# Patient Record
Sex: MALE | Race: BLACK OR AFRICAN AMERICAN | NOT HISPANIC OR LATINO | URBAN - METROPOLITAN AREA
[De-identification: names, ages, dates, MRNs, and addresses within clinical notes are randomized per-mention and may not be internally consistent; named-entity substitution may affect disease eponyms.]

---

## 2017-04-21 ENCOUNTER — TRANSFERRED RECORDS (OUTPATIENT)
Dept: HEALTH INFORMATION MANAGEMENT | Facility: CLINIC | Age: 42
End: 2017-04-21

## 2017-05-01 ENCOUNTER — TRANSFERRED RECORDS (OUTPATIENT)
Dept: HEALTH INFORMATION MANAGEMENT | Facility: CLINIC | Age: 42
End: 2017-05-01

## 2017-05-01 ENCOUNTER — MEDICAL CORRESPONDENCE (OUTPATIENT)
Dept: HEALTH INFORMATION MANAGEMENT | Facility: CLINIC | Age: 42
End: 2017-05-01

## 2017-07-13 ENCOUNTER — OFFICE VISIT (OUTPATIENT)
Dept: SURGERY | Facility: CLINIC | Age: 42
End: 2017-07-13

## 2017-07-13 VITALS
TEMPERATURE: 98.5 F | OXYGEN SATURATION: 97 % | HEART RATE: 73 BPM | HEIGHT: 71 IN | WEIGHT: 181.3 LBS | DIASTOLIC BLOOD PRESSURE: 81 MMHG | SYSTOLIC BLOOD PRESSURE: 115 MMHG | BODY MASS INDEX: 25.38 KG/M2

## 2017-07-13 DIAGNOSIS — Z21 HIV POSITIVE (H): ICD-10-CM

## 2017-07-13 DIAGNOSIS — K62.82 ANAL DYSPLASIA: Primary | ICD-10-CM

## 2017-07-13 RX ORDER — CALCIUM CARBONATE 500(1250)
1 TABLET ORAL 2 TIMES DAILY
COMMUNITY

## 2017-07-13 RX ORDER — LORATADINE 10 MG/1
10 TABLET ORAL DAILY
COMMUNITY

## 2017-07-13 RX ORDER — FLUOXETINE HYDROCHLORIDE 90 MG/1
90 CAPSULE, DELAYED RELEASE PELLETS ORAL WEEKLY
COMMUNITY

## 2017-07-13 ASSESSMENT — PAIN SCALES - GENERAL: PAINLEVEL: NO PAIN (0)

## 2017-07-13 NOTE — NURSING NOTE
"Chief Complaint   Patient presents with     Clinic Care Coordination - Follow-up     Patient here today for HRA.        Vitals:    07/13/17 1453   BP: 115/81   Pulse: 73   Temp: 98.5  F (36.9  C)   TempSrc: Oral   SpO2: 97%   Weight: 181 lb 4.8 oz   Height: 5' 11\"       Body mass index is 25.29 kg/(m^2).    Shar GONZALEZ LPN                        "

## 2017-07-13 NOTE — MR AVS SNAPSHOT
"              After Visit Summary   2017    Martin Yanez    MRN: [id number]           Patient Information     Date Of Birth          1975        Visit Information        Provider Department      2017 3:00 PM Liliane Waite APRN [facility] Colon and Rectal Surgery        Today's Diagnoses     Anal dysplasia    -  1    HIV positive (H)           Follow-ups after your visit        Who to contact     Please call your clinic at [phone] to:    Ask questions about your health    Make or cancel appointments    Discuss your medicines    Learn about your test results    Speak to your doctor   If you have compliments or concerns about an experience at your clinic, or if you wish to file a complaint, please contact [facility] Patient Relations at [phone] or email us at [email or web address]         Additional Information About Your Visit        MyChart Information     [employer] is an electronic gateway that provides easy, online access to your medical records. With [employer], you can request a clinic appointment, read your test results, renew a prescription or communicate with your care team.     To sign up for [employer] visit the website at [email or web address]   You will be asked to enter the access code listed below, as well as some personal information. Please follow the directions to create your username and password.     Your access code is: [email or web address]  Expires: 2017  6:31 AM     Your access code will  in 90 days. If you need help or a new code, please contact your [facility] or call [phone] for assistance.        Care EveryWhere ID     This is your Care EveryWhere ID. This could be used by other organizations to access your [facility] medical records  [id number]        Your Vitals Were     Pulse Temperature Height Pulse Oximetry BMI (Body Mass Index)       73 98.5  F (36.9  C) (Oral) 5' 11\" " 97% 25.29 kg/m2        Blood Pressure from Last 3 Encounters:   07/13/17 115/81   12/08/16 120/71    Weight from Last 3 Encounters:   07/13/17 181 lb 4.8 oz   12/08/16 175 lb 3.2 oz              We Performed the Following     Cytology non gyn (Anal PAP)     HC ANOSCOPY DX, HRA W/ BIOPSY(IES)     Surgical pathology exam        Primary Care Provider Office Phone # Fax #    Familia Leon [phone]       [facility] [address]        Equal Access to Services     JOEL CHICAS : Hadii sachin Munoz, watwyla quijano, cristhian morenoalarely lino, lisa henley. So [facility] [phone].    ATENCIÓN: Si habla español, tiene a fernandez disposición servicios gratuitos de asistencia lingüística. [facility] [phone].    We comply with applicable federal civil rights laws and [place] laws. We do not discriminate on the basis of race, color, national origin, age, disability sex, sexual orientation or gender identity.            Thank you!     Thank you for choosing [facility] COLON AND RECTAL SURGERY  for your care. Our goal is always to provide you with excellent care. Hearing back from our patients is one way we can continue to improve our services. Please take a few minutes to complete the written survey that you may receive in the mail after your visit with us. Thank you!             Your Updated Medication List - Protect others around you: Learn how to safely use, store and throw away your medicines at www.disposemymeds.org.          This list is accurate as of: 7/13/17  3:36 PM.  Always use your most recent med list.                   Brand Name Dispense Instructions for use Diagnosis    abacavir 300 MG tablet    ZIAGEN     Take 300 mg by mouth 2 times daily    Anal dysplasia       calcium carbonate 1250 MG tablet    OS-LILIYA 500 mg [place]. Ca     Take 1 tablet by mouth 2 times daily        FISH OIL OMEGA-3 PO           FLUoxetine 90 MG CR  capsule    PROzac WEEKLY     Take 90 mg by mouth once a week        GABAPENTIN PO      Take 300 mg by mouth 4 times daily        loratadine 10 MG tablet    CLARITIN     Take 10 mg by mouth daily        MULTIVITAMIN ADULT PO           nicotine 7 MG/24HR 24 hr patch    NICODERM CQ     Place 1 patch onto the skin every 24 hours    Anal dysplasia       VITAMIN B 12 PO           VITAMIN C PO           VITAMIN D (CHOLECALCIFEROL) PO      Take by mouth daily

## 2017-07-13 NOTE — LETTER
2017       RE: Martin Yanez  [address]  [place] [address]     Dear Colleague,    Thank you for referring your patient, Martin Yanez, to the [facility] COLON AND RECTAL SURGERY at [facility]. Please see a copy of my visit note below.      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     HPI: Martin reports that his HIV is under good control. He quit smoking two days after his last visit. He does have anal receptive intercourse. He denies any anorectal complaints.    ASSESSMENT: Written, informed consent was obtained prior to procedure.  Prior to the start of the procedure and with procedural staff participation, I verbally confirmed the patient s identity using two indicators, relevant allergies, that the procedure was appropriate and matched the consent or emergent situation, and that the correct equipment/implants were available. Immediately prior to starting the procedure I conducted the Time Out with the procedural staff and re-confirmed the patient s name, procedure, and site/side. (The Joint Commission universal protocol was followed.)  Yes    Sedation (Moderate or Deep): None    Anal cytology was obtained with Dacron swab. Digital anal rectal exam was performed with no palpable lesions. Dilute acetic acid soak was completed for 2 minutes. Lubricant was used to insert the anoscope. Performed high resolution microscopy using the colposcope. The dentate line was viewed in its entirety. Abnormal areas noted were a distinct patch of bright acetowhitening with punctation and striated vessels in the left lateral position just inside the anal verge.  After injection with 1.5% lidocaine with  epinephrine, biopsy was obtained in the left lateral anal canal using a baby Tischler forceps. Hemostasis was obtained using Monsel solution. There was some more mild acetowhitening without punctation in the posterior anal canal extending to the anal verge. No additional biopsies taken.  Perianal area was inspected after soaking with acetic acid. Small skin tag present in the right perianal position without abnormality. No other acetowhitening or punctation present.    The patient tolerated the procedures well.    PLAN: Will follow up with patient with results of biopsy and anal cytology from today. If low grade dysplasia or normal, follow up in 6 months for repeat high resolution anoscopy. If high grade dysplasia would recommend repeat high-resolution anoscopy in the operating room with fulguration and repeat high-resolution anoscopy 3 months following that in clinic. I congratulated him no smoking cessation.    Patient's questions were answered to his stated satisfaction and he is in agreement with this plan.    For details of past medical history, surgical history, family history, medications, allergies, and review of systems, please see details below.    Medical history:  No past medical history on file.    Surgical history:  No past surgical history on file.    Family history:  No family history on file.    Medications:  Current Outpatient Prescriptions   Medication Sig Dispense Refill     abacavir (ZIAGEN) 300 MG tablet Take 300 mg by mouth 2 times daily       dolutegravir (TIVICAY) 50 MG tablet Take 50 mg by mouth daily       LAMIVUDINE PO Take 150 mg by mouth 2 times daily       nicotine (NICODERM CQ) 7 MG/24HR 24 hr patch Place 1 patch onto the skin every 24 hours       Allergies:  The patientis allergic to efavirenz.  Social history:  Social History   Substance Use Topics     Smoking status: Not on file     Smokeless tobacco: Not on file     Alcohol use Not on file     Marital status: single.    Review of  Systems:  There are no exam notes on file for this visit.     This procedure was performed under a collaborative agreement with Dr. Logan Montana MD, Chief of Colon and Rectal Surgery, [facility].    This note was created using speech recognition software and may contain unintended word substitutions.      Again, thank you for allowing me to participate in the care of your patient.      Sincerely,    DELORY Larios CNP

## 2017-07-13 NOTE — PROGRESS NOTES
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     HPI: Martin reports that his HIV is under good control. He quit smoking two days after his last visit. He does have anal receptive intercourse. He denies any anorectal complaints.    ASSESSMENT: Written, informed consent was obtained prior to procedure.  Prior to the start of the procedure and with procedural staff participation, I verbally confirmed the patient s identity using two indicators, relevant allergies, that the procedure was appropriate and matched the consent or emergent situation, and that the correct equipment/implants were available. Immediately prior to starting the procedure I conducted the Time Out with the procedural staff and re-confirmed the patient s name, procedure, and site/side. (The Joint Commission universal protocol was followed.)  Yes    Sedation (Moderate or Deep): None    Anal cytology was obtained with Dacron swab. Digital anal rectal exam was performed with no palpable lesions. Dilute acetic acid soak was completed for 2 minutes. Lubricant was used to insert the anoscope. Performed high resolution microscopy using the colposcope. The dentate line was viewed in its entirety. Abnormal areas noted were a distinct patch of bright acetowhitening with punctation and striated vessels in the left lateral position just inside the anal verge.  After injection with 1.5% lidocaine with epinephrine, biopsy was obtained in the left lateral anal canal using a baby Tischler forceps. Hemostasis was obtained using Monsel solution. There was some more mild acetowhitening without punctation in the posterior anal canal extending to the anal verge. No additional biopsies taken.  Perianal area  was inspected after soaking with acetic acid. Small skin tag present in the right perianal position without abnormality. No other acetowhitening or punctation present.    The patient tolerated the procedures well.    PLAN: Will follow up with patient with results of biopsy and anal cytology from today. If low grade dysplasia or normal, follow up in 6 months for repeat high resolution anoscopy. If high grade dysplasia would recommend repeat high-resolution anoscopy in the operating room with fulguration and repeat high-resolution anoscopy 3 months following that in clinic. I congratulated him no smoking cessation.    Patient's questions were answered to his stated satisfaction and he is in agreement with this plan.    For details of past medical history, surgical history, family history, medications, allergies, and review of systems, please see details below.    Medical history:  No past medical history on file.    Surgical history:  No past surgical history on file.    Family history:  No family history on file.    Medications:  Current Outpatient Prescriptions   Medication Sig Dispense Refill     abacavir (ZIAGEN) 300 MG tablet Take 300 mg by mouth 2 times daily       dolutegravir (TIVICAY) 50 MG tablet Take 50 mg by mouth daily       LAMIVUDINE PO Take 150 mg by mouth 2 times daily       nicotine (NICODERM CQ) 7 MG/24HR 24 hr patch Place 1 patch onto the skin every 24 hours       Allergies:  The patientis allergic to efavirenz.    Social history:  Social History   Substance Use Topics     Smoking status: Not on file     Smokeless tobacco: Not on file     Alcohol use Not on file     Marital status: single.    Review of Systems:  There are no exam notes on file for this visit.     This procedure was performed under a collaborative agreement with Dr. Logan Montana MD, Chief of Colon and Rectal Surgery, [facility].    Liliane Bolden NP-C  Colon and Rectal Surgery  [facility]  [place] [facility]      This note was created using speech recognition software and may contain unintended word substitutions.

## 2017-07-15 LAB — COPATH REPORT: NORMAL

## 2017-07-17 ENCOUNTER — TELEPHONE (OUTPATIENT)
Dept: SURGERY | Facility: CLINIC | Age: 42
End: 2017-07-17

## 2017-07-17 LAB — COPATH REPORT: NORMAL

## 2017-07-17 NOTE — TELEPHONE ENCOUNTER
Called to discuss pathology results showing AIN 1. Would recommend repeat HRA in 6 months. Left a message.    DELROY Wilcox, NP-C  Colon and Rectal Surgery  [facility]

## 2018-02-07 ENCOUNTER — TELEPHONE (OUTPATIENT)
Dept: SURGERY | Facility: CLINIC | Age: 43
End: 2018-02-07

## 2018-02-07 NOTE — TELEPHONE ENCOUNTER
I spoke with patient that I will be faxing a request to the [facility] to obtain authorization to have him return for follow-up anal microscopy with Liliane Bolden (Colorectal).  If [facility] a call [facility] he knows to call them back to approve. Geraldine [phone]    18 I had not received the [facility] authorization so I called [facility] phone [phone] and she said the consult has been written up but the patient never returned their call saying he has agreed to the visit.  I left a VM on the patient's phone to call the [facility] asap this week or the consult will  and we'll need to start over. Once the [facility] receives patient's phone call, the [facility] will fax me the authorization and then I can schedule the patient.  Geraldine [phone]    5/15/18 Left VM for patient that the [facility] Steven phone [phone] will be faxing me the authorization and to return my call to schedule a date.

## 2018-07-06 ENCOUNTER — TELEPHONE (OUTPATIENT)
Dept: SURGERY | Facility: CLINIC | Age: 43
End: 2018-07-06

## 2018-07-06 NOTE — TELEPHONE ENCOUNTER
Left VM for pt to return my call to schedule follow-up (anal microscopy) appt w/ Liliane Bolden (Colorectal).  This is the 2nd appointment that he missed.  Geraldine [phone]

## 2018-08-02 ENCOUNTER — OFFICE VISIT (OUTPATIENT)
Dept: SURGERY | Facility: CLINIC | Age: 43
End: 2018-08-02
Payer: COMMERCIAL

## 2018-08-02 VITALS
TEMPERATURE: 98.1 F | HEIGHT: 71 IN | BODY MASS INDEX: 25.56 KG/M2 | WEIGHT: 182.6 LBS | DIASTOLIC BLOOD PRESSURE: 78 MMHG | SYSTOLIC BLOOD PRESSURE: 111 MMHG | HEART RATE: 68 BPM | OXYGEN SATURATION: 97 %

## 2018-08-02 DIAGNOSIS — K62.82 ANAL DYSPLASIA: Primary | ICD-10-CM

## 2018-08-02 ASSESSMENT — PAIN SCALES - GENERAL: PAINLEVEL: NO PAIN (0)

## 2018-08-02 NOTE — PROGRESS NOTES
Colon and Rectal Surgery Clinic High Resolution Anoscopy Note    RE: Martin Yanez  : 1975  VONNIE: 2018    Martin Yanez is a 41 year old HIV positive male with a history of ASCUS on anal cytology in 2016 and HRA last on 2017 with biopsies showing AIN 1 with last anal cytology on 17, which was negative. He presents today for high resolution anoscopy.     HPI: Martin reports that his HIV is under good control. He started smoking again but is back on a patch and is trying to quit again. He does have anal receptive intercourse. He denies any anorectal complaints.    ASSESSMENT: Written, informed consent was obtained prior to procedure.  Prior to the start of the procedure and with procedural staff participation, I verbally confirmed the patient s identity using two indicators, relevant allergies, that the procedure was appropriate and matched the consent or emergent situation, and that the correct equipment/implants were available. Immediately prior to starting the procedure I conducted the Time Out with the procedural staff and re-confirmed the patient s name, procedure, and site/side. (The Joint Commission universal protocol was followed.)  Yes    Sedation (Moderate or Deep): None    Anal cytology was not obtained today. Digital anal rectal exam was performed with no palpable lesions. Dilute acetic acid soak was completed for 2 minutes. Lubricant was used to insert the anoscope. Performed high resolution microscopy using the colposcope. The dentate line was viewed in its entirety. Abnormal areas noted were a distinct patch of acetowhitening with punctation and striated vessels in the left lateral position just inside the anal verge.  Prior biopsy at this site showed AIN 1 and this appeared unchanged so no additional biopsy taken today.  There was some more mild acetowhitening without punctation in the posterior anal canal extending to the anal verge.   Perianal area was inspected after soaking  with acetic acid. Small skin tag present in the right perianal position without abnormality. No other acetowhitening or punctation present.    The patient tolerated the procedures well.    PLAN: Recommend repeat high resolution anoscopy in clinic in 6 months. Strongly encouraged smoking cessation and congratulated him on his current efforts at this.     Patient's questions were answered to his stated satisfaction and he is in agreement with this plan.    For details of past medical history, surgical history, family history, medications, allergies, and review of systems, please see details below.    Medical history:  No past medical history on file.    Surgical history:  No past surgical history on file.    Family history:  No family history on file.    Medications:  Current Outpatient Prescriptions   Medication Sig Dispense Refill     abacavir (ZIAGEN) 300 MG tablet Take 300 mg by mouth 2 times daily       Ascorbic Acid (VITAMIN C PO)        calcium carbonate (OS-LILIYA 500 MG [place]. CA) 1250 MG tablet Take 1 tablet by mouth 2 times daily       Cyanocobalamin (VITAMIN B 12 PO)        FLUoxetine (PROZAC WEEKLY) 90 MG CR capsule Take 90 mg by mouth once a week       GABAPENTIN PO Take 300 mg by mouth 4 times daily       loratadine (CLARITIN) 10 MG tablet Take 10 mg by mouth daily       Multiple Vitamins-Minerals (MULTIVITAMIN ADULT PO)        nicotine (NICODERM CQ) 7 MG/24HR 24 hr patch Place 1 patch onto the skin every 24 hours       Omega-3 Fatty Acids (FISH OIL OMEGA-3 PO)        VITAMIN D, CHOLECALCIFEROL, PO Take by mouth daily       Allergies:  The patientis allergic to efavirenz.    Social history:  Social History   Substance Use Topics     Smoking status: Former Smoker     Types: Cigarettes     Smokeless tobacco: Not on file     Alcohol use Yes     Marital status: single.    Review of Systems:  There are no exam notes on file for this visit.     This procedure was performed under a collaborative agreement with   Logan Montana MD, Chief of Colon and Rectal Surgery, [facility].    Liliane Bolden NP-C  Colon and Rectal Surgery  [facility]      This note was created using speech recognition software and may contain unintended word substitutions.

## 2018-08-02 NOTE — LETTER
2018       RE: Martin Yanez  [address]  [place] [address]     Dear Colleague,    Thank you for referring your patient, Martin Yanez, to the [facility] COLON AND RECTAL SURGERY at [facility]. Please see a copy of my visit note below.      Colon and Rectal Surgery Clinic High Resolution Anoscopy Note    RE: Martin Yanez  : 1975  OVNNIE: 2018    Martin Yanez is a 41 year old HIV positive male with a history of ASCUS on anal cytology in 2016 and HRA last on 2017 with biopsies showing AIN 1 with last anal cytology on 17, which was negative. He presents today for high resolution anoscopy.     HPI: Martin reports that his HIV is under good control. He started smoking again but is back on a patch and is trying to quit again. He does have anal receptive intercourse. He denies any anorectal complaints.    ASSESSMENT: Written, informed consent was obtained prior to procedure.  Prior to the start of the procedure and with procedural staff participation, I verbally confirmed the patient s identity using two indicators, relevant allergies, that the procedure was appropriate and matched the consent or emergent situation, and that the correct equipment/implants were available. Immediately prior to starting the procedure I conducted the Time Out with the procedural staff and re-confirmed the patient s name, procedure, and site/side. (The Joint Commission universal protocol was followed.)  Yes    Sedation (Moderate or Deep): None    Anal cytology was not obtained today. Digital anal rectal exam was performed with no palpable lesions. Dilute acetic acid soak was completed for 2 minutes. Lubricant was used to insert the anoscope. Performed high resolution microscopy using the colposcope. The dentate line was viewed in its entirety. Abnormal areas noted were a distinct patch of acetowhitening with punctation and striated vessels in the left lateral position just  inside the anal verge.  Prior biopsy at this site showed AIN 1 and this appeared unchanged so no additional biopsy taken today.  There was some more mild acetowhitening without punctation in the posterior anal canal extending to the anal verge.   Perianal area was inspected after soaking with acetic acid. Small skin tag present in the right perianal position without abnormality. No other acetowhitening or punctation present.    The patient tolerated the procedures well.    PLAN: Recommend repeat high resolution anoscopy in clinic in 6 months. Strongly encouraged smoking cessation and congratulated him on his current efforts at this.     Patient's questions were answered to his stated satisfaction and he is in agreement with this plan.    For details of past medical history, surgical history, family history, medications, allergies, and review of systems, please see details below.    Medical history:  No past medical history on file.    Surgical history:  No past surgical history on file.    Family history:  No family history on file.    Medications:  Current Outpatient Prescriptions   Medication Sig Dispense Refill     abacavir (ZIAGEN) 300 MG tablet Take 300 mg by mouth 2 times daily       Ascorbic Acid (VITAMIN C PO)        calcium carbonate (OS-LILIYA 500 MG [place]. CA) 1250 MG tablet Take 1 tablet by mouth 2 times daily       Cyanocobalamin (VITAMIN B 12 PO)        FLUoxetine (PROZAC WEEKLY) 90 MG CR capsule Take 90 mg by mouth once a week       GABAPENTIN PO Take 300 mg by mouth 4 times daily       loratadine (CLARITIN) 10 MG tablet Take 10 mg by mouth daily       Multiple Vitamins-Minerals (MULTIVITAMIN ADULT PO)        nicotine (NICODERM CQ) 7 MG/24HR 24 hr patch Place 1 patch onto the skin every 24 hours       Omega-3 Fatty Acids (FISH OIL OMEGA-3 PO)        VITAMIN D, CHOLECALCIFEROL, PO Take by mouth daily       Allergies:  The patientis allergic to efavirenz.    Social history:  Social History   Substance Use  Topics     Smoking status: Former Smoker     Types: Cigarettes     Smokeless tobacco: Not on file     Alcohol use Yes     Marital status: single.    Review of Systems:  There are no exam notes on file for this visit.     This procedure was performed under a collaborative agreement with Dr. Logan Montana MD, Chief of Colon and Rectal Surgery, [facility].    Liliane Bolden NP-C  Colon and Rectal Surgery  [facility]      This note was created using speech recognition software and may contain unintended word substitutions.

## 2018-08-02 NOTE — MR AVS SNAPSHOT
"              After Visit Summary   8/2/2018    Martin Yanez    MRN: [id number]           Patient Information     Date Of Birth          1975        Visit Information        Provider Department      8/2/2018 11:30 AM Liliane Waite APRN CNP [facility]        Today's Diagnoses     Anal dysplasia    -  1       Follow-ups after your visit        Who to contact     Please call your clinic at [phone] to:    Ask questions about your health    Make or cancel appointments    Discuss your medicines    Learn about your test results    Speak to your doctor            Additional Information About Your Visit        Care EveryWhere ID     This is your Care EveryWhere ID. This could be used by other organizations to access your [place] medical records  [id number]        Your Vitals Were     Pulse Temperature Height Pulse Oximetry BMI (Body Mass Index)       68 98.1  F (36.7  C) (Oral) 5' 11\" 97% 25.47 kg/m2        Blood Pressure from Last 3 Encounters:   08/02/18 111/78   07/13/17 115/81   12/08/16 120/71    Weight from Last 3 Encounters:   08/02/18 182 lb 9.6 oz   07/13/17 181 lb 4.8 oz   12/08/16 175 lb 3.2 oz              We Performed the Following     HC ANOSCOPY DX, HRA, INCL CONNIE SPECIMEN, BRUSH/WASH        Primary Care Provider Office Phone # Fax #    Familia Leon [phone]       [facility] [address] [facility] [id number]        Equal Access to Services     JOEL CHICAS : Hadii sachin ku hadasho Soyamel, waaxda luqadaha, qaybta kaalmada adeegyada, lisa starkey . So [facility] [phone].    ATENCIÓN: Si habla español, tiene a fernandez disposición servicios gratuitos de asistencia lingüística. Higinio al [phone].    We comply with applicable federal civil rights laws and [place] laws. We do not discriminate on the basis of race, color, national origin, age, disability, sex, sexual orientation, or gender " identity.            Thank you!     Thank you for choosing [facility]  for your care. Our goal is always to provide you with excellent care. Hearing back from our patients is one way we can continue to improve our services. Please take a few minutes to complete the written survey that you may receive in the mail after your visit with us. Thank you!             Your Updated Medication List - Protect others around you: Learn how to safely use, store and throw away your medicines at www.disposemOPS USAeds.org.          This list is accurate as of 8/2/18 12:25 PM.  Always use your most recent med list.                   Brand Name Dispense Instructions for use Diagnosis    abacavir 300 MG tablet    ZIAGEN     Take 300 mg by mouth 2 times daily    Anal dysplasia       calcium carbonate 500 MG tablet    OS-LILIYA 500 mg [place]. [place]     Take 1 tablet by mouth 2 times daily        FISH OIL OMEGA-3 PO           FLUoxetine 90 MG CR capsule    PROzac WEEKLY     Take 90 mg by mouth once a week        GABAPENTIN PO      Take 300 mg by mouth 4 times daily        loratadine 10 MG tablet    CLARITIN     Take 10 mg by mouth daily        MULTIVITAMIN ADULT PO           nicotine 7 MG/24HR 24 hr patch    NICODERM CQ     Place 1 patch onto the skin every 24 hours    Anal dysplasia       VITAMIN B 12 PO           VITAMIN C PO           VITAMIN D (CHOLECALCIFEROL) PO      Take by mouth daily

## 2018-08-02 NOTE — NURSING NOTE
"Chief Complaint   Patient presents with     Micrsoscopy       Vitals:    08/02/18 1114   BP: 111/78   Pulse: 68   Temp: 98.1  F (36.7  C)   TempSrc: Oral   SpO2: 97%   Weight: 182 lb 9.6 oz   Height: 5' 11\"       Body mass index is 25.47 kg/(m^2).      Estrada Cochran, EMT                      "

## 2020-12-30 ENCOUNTER — TRANSCRIBE ORDERS (OUTPATIENT)
Dept: OTHER | Age: 45
End: 2020-12-30

## 2020-12-30 DIAGNOSIS — R85.619 UNSPECIFIED ABNORMAL CYTOLOGICAL FINDINGS IN SPECIMENS FROM ANUS: Primary | ICD-10-CM

## 2022-03-22 ENCOUNTER — TRANSCRIBE ORDERS (OUTPATIENT)
Dept: OTHER | Age: 47
End: 2022-03-22

## 2022-03-22 DIAGNOSIS — K62.82 DYSPLASIA OF ANUS: Primary | ICD-10-CM

## 2022-03-24 ENCOUNTER — DOCUMENTATION ONLY (OUTPATIENT)
Dept: SURGERY | Facility: CLINIC | Age: 47
End: 2022-03-24

## 2022-03-24 NOTE — PROGRESS NOTES
Patient is scheduled for High Resolution Anoscopy with JACOBO Wilcox on 5/19/2022 at 12:00 pm. [facility] is [facility]. [facility] auth was requested on 3/24/2022. Instructed [facility] to fax back the authorization number to us at [phone].       Leatha Romo CMA

## 2022-07-15 ENCOUNTER — OFFICE VISIT (OUTPATIENT)
Dept: SURGERY | Facility: CLINIC | Age: 47
End: 2022-07-15
Payer: COMMERCIAL

## 2022-07-15 VITALS
OXYGEN SATURATION: 98 % | DIASTOLIC BLOOD PRESSURE: 72 MMHG | HEART RATE: 84 BPM | BODY MASS INDEX: 25.83 KG/M2 | WEIGHT: 184.5 LBS | SYSTOLIC BLOOD PRESSURE: 115 MMHG | HEIGHT: 71 IN

## 2022-07-15 DIAGNOSIS — K62.82 DYSPLASIA OF ANUS: ICD-10-CM

## 2022-07-15 LAB
LAB DIRECTOR COMMENTS: ABNORMAL
LAB DIRECTOR DISCLAIMER: ABNORMAL
LAB DIRECTOR INTERPRETATION: ABNORMAL
LAB DIRECTOR METHODOLOGY: ABNORMAL
LAB DIRECTOR RESULTS: ABNORMAL
SPECIMEN DESCRIPTION: ABNORMAL

## 2022-07-15 PROCEDURE — 99207 ZZHC ANOSCOPY DX, HRA, INCL COLL SPECIMEN, BRUSH/WASH: CPT | Performed by: NURSE PRACTITIONER

## 2022-07-15 PROCEDURE — 99203 OFFICE O/P NEW LOW 30 MIN: CPT | Mod: 25 | Performed by: NURSE PRACTITIONER

## 2022-07-15 PROCEDURE — 46601 DIAGNOSTIC ANOSCOPY: CPT | Performed by: NURSE PRACTITIONER

## 2022-07-15 PROCEDURE — 88112 CYTOPATH CELL ENHANCE TECH: CPT | Mod: TC | Performed by: NURSE PRACTITIONER

## 2022-07-15 PROCEDURE — 88112 CYTOPATH CELL ENHANCE TECH: CPT | Mod: 26 | Performed by: PATHOLOGY

## 2022-07-15 PROCEDURE — 87624 HPV HI-RISK TYP POOLED RSLT: CPT | Performed by: NURSE PRACTITIONER

## 2022-07-15 PROCEDURE — G0452 MOLECULAR PATHOLOGY INTERPR: HCPCS | Mod: 26 | Performed by: PATHOLOGY

## 2022-07-15 ASSESSMENT — PAIN SCALES - GENERAL: PAINLEVEL: NO PAIN (0)

## 2022-07-15 NOTE — NURSING NOTE
"Chief Complaint   Patient presents with     Follow Up     HRA       Vitals:    07/15/22 0921   BP: 115/72   BP Location: Left arm   Patient Position: Sitting   Cuff Size: Adult Regular   Pulse: 84   SpO2: 98%   Weight: 184 lb 8 oz   Height: 5' 11\"       Body mass index is 25.73 kg/m .    Leatha Romo CMA    "
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
## 2022-07-18 LAB
PATH REPORT.COMMENTS IMP SPEC: ABNORMAL
PATH REPORT.COMMENTS IMP SPEC: YES
PATH REPORT.FINAL DX SPEC: ABNORMAL
PATH REPORT.GROSS SPEC: ABNORMAL
PATH REPORT.MICROSCOPIC SPEC OTHER STN: ABNORMAL
PATH REPORT.RELEVANT HX SPEC: ABNORMAL

## 2023-09-29 ENCOUNTER — TELEPHONE (OUTPATIENT)
Dept: SURGERY | Facility: CLINIC | Age: 48
End: 2023-09-29

## 2023-10-27 ENCOUNTER — TRANSCRIBE ORDERS (OUTPATIENT)
Dept: OTHER | Age: 48
End: 2023-10-27

## 2023-10-27 DIAGNOSIS — K62.82 DYSPLASIA OF ANUS: Primary | ICD-10-CM

## 2024-01-22 ENCOUNTER — TELEPHONE (OUTPATIENT)
Dept: SURGERY | Facility: CLINIC | Age: 49
End: 2024-01-22

## 2024-01-22 NOTE — TELEPHONE ENCOUNTER
ON 1/19/2024 at 8:26 AM:  Vm msg received from patient wanting to reschedule his Microscopy appt with Liliane Bolden NP on 1/19/2/2024 to another date. Patient states that the appt interferes with a dental appt.    On 1/22/2024 at 1:04 PM:  Spoke with patient, rescheduled his Microscopy appt with Liliane Bolden NP, to 2/2/2024 at 1:15 PM.    Lauren Lacy  Isis-op Coordinator  [facility]  Direct Phone: [phone]

## 2024-05-14 ENCOUNTER — TELEPHONE (OUTPATIENT)
Dept: SURGERY | Facility: CLINIC | Age: 49
End: 2024-05-14

## 2024-05-14 NOTE — TELEPHONE ENCOUNTER
Left Voicemail (1st Attempt) for the patient to call back and schedule the following:    Appointment type: Microscopy  Provider: Liliane Waite  Return date: First available appt  Specialty phone number: [phone]  Additional appointment(s) needed: n/a  For / Appt Notes: HRA  Additonal Notes: per message from Leatha Romo CMA    Left CC#

## 2024-11-15 ENCOUNTER — TELEPHONE (OUTPATIENT)
Dept: SURGERY | Facility: CLINIC | Age: 49
End: 2024-11-15

## 2024-11-15 NOTE — TELEPHONE ENCOUNTER
Spoke with pt about scheduling for an HRA since he is overdue for this. Pt explains that he is moving to [place] at the end of the month and is in the process of transferring all of his care down there. Pt also requests that we remove him from our HRA pt list since he will no longer be needing our services.     Leatha Lamar, CMA

## (undated) RX ORDER — ACETIC ACID 5 %
LIQUID (ML) MISCELLANEOUS
Status: DISPENSED
Start: 2018-08-02

## (undated) RX ORDER — FERRIC SUBSULFATE 0.21 G/G
LIQUID TOPICAL
Status: DISPENSED
Start: 2018-08-02

## (undated) RX ORDER — ACETIC ACID 5 %
LIQUID (ML) MISCELLANEOUS
Status: DISPENSED
Start: 2022-07-15

## (undated) RX ORDER — LIDOCAINE HYDROCHLORIDE AND EPINEPHRINE 15; 5 MG/ML; UG/ML
INJECTION, SOLUTION EPIDURAL
Status: DISPENSED
Start: 2017-07-13

## (undated) RX ORDER — FERRIC SUBSULFATE 0.21 G/G
LIQUID TOPICAL
Status: DISPENSED
Start: 2022-07-15

## (undated) RX ORDER — LIDOCAINE HYDROCHLORIDE AND EPINEPHRINE 10; 10 MG/ML; UG/ML
INJECTION, SOLUTION INFILTRATION; PERINEURAL
Status: DISPENSED
Start: 2022-07-15

## (undated) RX ORDER — FERRIC SUBSULFATE 0.21 G/G
LIQUID TOPICAL
Status: DISPENSED
Start: 2017-07-13

## (undated) RX ORDER — ACETIC ACID 5 %
LIQUID (ML) MISCELLANEOUS
Status: DISPENSED
Start: 2017-07-13